# Patient Record
Sex: FEMALE | Race: WHITE | NOT HISPANIC OR LATINO | URBAN - METROPOLITAN AREA
[De-identification: names, ages, dates, MRNs, and addresses within clinical notes are randomized per-mention and may not be internally consistent; named-entity substitution may affect disease eponyms.]

---

## 2017-05-17 ENCOUNTER — OUTPATIENT (OUTPATIENT)
Dept: OUTPATIENT SERVICES | Facility: HOSPITAL | Age: 62
LOS: 1 days | End: 2017-05-17
Payer: COMMERCIAL

## 2017-05-17 PROCEDURE — G0206: CPT | Mod: 26,LT

## 2017-05-17 PROCEDURE — 77065 DX MAMMO INCL CAD UNI: CPT

## 2017-11-27 ENCOUNTER — OUTPATIENT (OUTPATIENT)
Dept: OUTPATIENT SERVICES | Facility: HOSPITAL | Age: 62
LOS: 1 days | End: 2017-11-27
Payer: COMMERCIAL

## 2017-11-27 PROCEDURE — 76641 ULTRASOUND BREAST COMPLETE: CPT

## 2017-11-27 PROCEDURE — 76641 ULTRASOUND BREAST COMPLETE: CPT | Mod: 26,50

## 2017-11-27 PROCEDURE — 77066 DX MAMMO INCL CAD BI: CPT

## 2017-11-27 PROCEDURE — G0204: CPT | Mod: 26

## 2017-12-12 ENCOUNTER — OUTPATIENT (OUTPATIENT)
Dept: OUTPATIENT SERVICES | Facility: HOSPITAL | Age: 62
LOS: 1 days | End: 2017-12-12
Payer: COMMERCIAL

## 2017-12-12 PROCEDURE — 88305 TISSUE EXAM BY PATHOLOGIST: CPT

## 2017-12-12 PROCEDURE — A4648: CPT

## 2017-12-12 PROCEDURE — 19081 BX BREAST 1ST LESION STRTCTC: CPT

## 2017-12-12 PROCEDURE — C1819: CPT

## 2017-12-12 PROCEDURE — 19081 BX BREAST 1ST LESION STRTCTC: CPT | Mod: LT

## 2017-12-13 LAB — SURGICAL PATHOLOGY STUDY: SIGNIFICANT CHANGE UP

## 2018-01-31 PROBLEM — Z00.00 ENCOUNTER FOR PREVENTIVE HEALTH EXAMINATION: Status: ACTIVE | Noted: 2018-01-31

## 2018-06-15 ENCOUNTER — APPOINTMENT (OUTPATIENT)
Dept: MAMMOGRAPHY | Facility: HOSPITAL | Age: 63
End: 2018-06-15

## 2018-06-15 ENCOUNTER — OUTPATIENT (OUTPATIENT)
Dept: OUTPATIENT SERVICES | Facility: HOSPITAL | Age: 63
LOS: 1 days | End: 2018-06-15
Payer: COMMERCIAL

## 2018-06-15 PROCEDURE — 77066 DX MAMMO INCL CAD BI: CPT | Mod: 26

## 2018-06-15 PROCEDURE — 77066 DX MAMMO INCL CAD BI: CPT

## 2018-06-20 ENCOUNTER — OUTPATIENT (OUTPATIENT)
Dept: OUTPATIENT SERVICES | Facility: HOSPITAL | Age: 63
LOS: 1 days | End: 2018-06-20
Payer: COMMERCIAL

## 2018-06-20 ENCOUNTER — APPOINTMENT (OUTPATIENT)
Dept: MAMMOGRAPHY | Facility: HOSPITAL | Age: 63
End: 2018-06-20
Payer: COMMERCIAL

## 2018-06-20 ENCOUNTER — RESULT REVIEW (OUTPATIENT)
Age: 63
End: 2018-06-20

## 2018-06-20 PROCEDURE — A4648: CPT

## 2018-06-20 PROCEDURE — 88305 TISSUE EXAM BY PATHOLOGIST: CPT

## 2018-06-20 PROCEDURE — 19081 BX BREAST 1ST LESION STRTCTC: CPT | Mod: RT

## 2018-06-20 PROCEDURE — 19081 BX BREAST 1ST LESION STRTCTC: CPT

## 2018-06-21 LAB — SURGICAL PATHOLOGY STUDY: SIGNIFICANT CHANGE UP

## 2018-12-14 ENCOUNTER — OUTPATIENT (OUTPATIENT)
Dept: OUTPATIENT SERVICES | Facility: HOSPITAL | Age: 63
LOS: 1 days | End: 2018-12-14
Payer: COMMERCIAL

## 2018-12-14 ENCOUNTER — APPOINTMENT (OUTPATIENT)
Dept: MAMMOGRAPHY | Facility: HOSPITAL | Age: 63
End: 2018-12-14
Payer: COMMERCIAL

## 2018-12-14 PROCEDURE — 77065 DX MAMMO INCL CAD UNI: CPT

## 2018-12-14 PROCEDURE — 77065 DX MAMMO INCL CAD UNI: CPT | Mod: 26,RT

## 2018-12-14 PROCEDURE — G0279: CPT

## 2018-12-14 PROCEDURE — G0279: CPT | Mod: 26

## 2019-06-27 ENCOUNTER — APPOINTMENT (OUTPATIENT)
Dept: ULTRASOUND IMAGING | Facility: HOSPITAL | Age: 64
End: 2019-06-27

## 2019-06-27 ENCOUNTER — OUTPATIENT (OUTPATIENT)
Dept: OUTPATIENT SERVICES | Facility: HOSPITAL | Age: 64
LOS: 1 days | End: 2019-06-27
Payer: COMMERCIAL

## 2019-06-27 ENCOUNTER — APPOINTMENT (OUTPATIENT)
Dept: MAMMOGRAPHY | Facility: HOSPITAL | Age: 64
End: 2019-06-27
Payer: COMMERCIAL

## 2019-06-27 PROCEDURE — 76641 ULTRASOUND BREAST COMPLETE: CPT | Mod: 26,50

## 2019-06-27 PROCEDURE — 77066 DX MAMMO INCL CAD BI: CPT | Mod: 26

## 2019-06-27 PROCEDURE — 77066 DX MAMMO INCL CAD BI: CPT

## 2019-06-27 PROCEDURE — 76641 ULTRASOUND BREAST COMPLETE: CPT

## 2019-07-17 ENCOUNTER — APPOINTMENT (OUTPATIENT)
Dept: MAMMOGRAPHY | Facility: HOSPITAL | Age: 64
End: 2019-07-17
Payer: COMMERCIAL

## 2019-07-17 ENCOUNTER — RESULT REVIEW (OUTPATIENT)
Age: 64
End: 2019-07-17

## 2019-07-17 ENCOUNTER — OUTPATIENT (OUTPATIENT)
Dept: OUTPATIENT SERVICES | Facility: HOSPITAL | Age: 64
LOS: 1 days | End: 2019-07-17
Payer: COMMERCIAL

## 2019-07-17 PROCEDURE — 19081 BX BREAST 1ST LESION STRTCTC: CPT

## 2019-07-17 PROCEDURE — A4648: CPT

## 2019-07-17 PROCEDURE — 19081 BX BREAST 1ST LESION STRTCTC: CPT | Mod: RT

## 2019-07-17 PROCEDURE — 88305 TISSUE EXAM BY PATHOLOGIST: CPT

## 2019-07-18 LAB — SURGICAL PATHOLOGY STUDY: SIGNIFICANT CHANGE UP

## 2020-01-13 ENCOUNTER — OUTPATIENT (OUTPATIENT)
Dept: OUTPATIENT SERVICES | Facility: HOSPITAL | Age: 65
LOS: 1 days | End: 2020-01-13
Payer: COMMERCIAL

## 2020-01-13 ENCOUNTER — APPOINTMENT (OUTPATIENT)
Dept: MAMMOGRAPHY | Facility: HOSPITAL | Age: 65
End: 2020-01-13
Payer: MEDICARE

## 2020-01-13 PROCEDURE — G0279: CPT | Mod: 26

## 2020-01-13 PROCEDURE — G0279: CPT

## 2020-01-13 PROCEDURE — 77065 DX MAMMO INCL CAD UNI: CPT

## 2020-01-13 PROCEDURE — 77065 DX MAMMO INCL CAD UNI: CPT | Mod: 26,RT

## 2020-07-06 ENCOUNTER — APPOINTMENT (OUTPATIENT)
Dept: MAMMOGRAPHY | Facility: HOSPITAL | Age: 65
End: 2020-07-06
Payer: MEDICARE

## 2020-07-06 ENCOUNTER — OUTPATIENT (OUTPATIENT)
Dept: OUTPATIENT SERVICES | Facility: HOSPITAL | Age: 65
LOS: 1 days | End: 2020-07-06
Payer: MEDICARE

## 2020-07-06 PROCEDURE — G0279: CPT | Mod: 26

## 2020-07-06 PROCEDURE — 77066 DX MAMMO INCL CAD BI: CPT

## 2020-07-06 PROCEDURE — G0279: CPT

## 2020-07-06 PROCEDURE — 77066 DX MAMMO INCL CAD BI: CPT | Mod: 26

## 2021-08-23 ENCOUNTER — OUTPATIENT (OUTPATIENT)
Dept: OUTPATIENT SERVICES | Facility: HOSPITAL | Age: 66
LOS: 1 days | End: 2021-08-23
Payer: MEDICARE

## 2021-08-23 ENCOUNTER — APPOINTMENT (OUTPATIENT)
Dept: MAMMOGRAPHY | Facility: HOSPITAL | Age: 66
End: 2021-08-23
Payer: MEDICARE

## 2021-08-23 PROCEDURE — 77066 DX MAMMO INCL CAD BI: CPT | Mod: 26

## 2021-08-23 PROCEDURE — 77066 DX MAMMO INCL CAD BI: CPT

## 2022-09-16 ENCOUNTER — APPOINTMENT (OUTPATIENT)
Dept: ORTHOPEDIC SURGERY | Facility: CLINIC | Age: 67
End: 2022-09-16

## 2022-09-16 DIAGNOSIS — E11.9 TYPE 2 DIABETES MELLITUS W/OUT COMPLICATIONS: ICD-10-CM

## 2022-09-16 DIAGNOSIS — M17.12 UNILATERAL PRIMARY OSTEOARTHRITIS, LEFT KNEE: ICD-10-CM

## 2022-09-16 PROCEDURE — 99213 OFFICE O/P EST LOW 20 MIN: CPT

## 2022-09-16 NOTE — DISCUSSION/SUMMARY
[de-identified] :  patient currently doing well.  Patient has no complaints at this time.  Discussed with patient to continue stretching at home, range of motion exercises for tightness in leg.  Advised patient to call office if pain worsens or changes.  Future repeat gel injection if needed.  Call if any questions or concerns.  Patient understands and agrees with plan.

## 2022-09-16 NOTE — IMAGING
[de-identified] :  On examination of the patient's left knee,  no edema, non ecchymotic, skin is intact.  mild tenderness along the medial lateral joint lines. She has a positive patellar grind. She can actively straight leg raise. Her motion is approximately 0-125. No instability in the knee. Calf soft and nontender. Neurovascular intact distally.

## 2022-09-16 NOTE — HISTORY OF PRESENT ILLNESS
[de-identified] : Patient is a 66-year-old female here for follow-up left knee osteoarthritis.  Patient states she was having pain due to a lot of walking couple weeks ago but has  overall improved.  Patient states she is feeling a lot better.  Patient states pain comes and goes since she has been treated.  Patient states pain comes with activity when walking long distances, standing long periods of time.  Denies instability, denies numbness and tingling, denies injury/trauma

## 2022-09-27 ENCOUNTER — APPOINTMENT (OUTPATIENT)
Dept: MAMMOGRAPHY | Facility: HOSPITAL | Age: 67
End: 2022-09-27

## 2022-09-27 ENCOUNTER — APPOINTMENT (OUTPATIENT)
Dept: ULTRASOUND IMAGING | Facility: HOSPITAL | Age: 67
End: 2022-09-27

## 2022-09-27 ENCOUNTER — OUTPATIENT (OUTPATIENT)
Dept: OUTPATIENT SERVICES | Facility: HOSPITAL | Age: 67
LOS: 1 days | End: 2022-09-27
Payer: MEDICARE

## 2022-09-27 PROCEDURE — 77066 DX MAMMO INCL CAD BI: CPT

## 2022-09-27 PROCEDURE — 77066 DX MAMMO INCL CAD BI: CPT | Mod: 26

## 2022-09-27 PROCEDURE — G0279: CPT | Mod: 26

## 2022-09-27 PROCEDURE — 76641 ULTRASOUND BREAST COMPLETE: CPT

## 2022-09-27 PROCEDURE — 76641 ULTRASOUND BREAST COMPLETE: CPT | Mod: 26,50

## 2022-09-27 PROCEDURE — 82565 ASSAY OF CREATININE: CPT

## 2022-09-27 PROCEDURE — G0279: CPT

## 2022-12-22 ENCOUNTER — APPOINTMENT (OUTPATIENT)
Dept: ORTHOPEDIC SURGERY | Facility: CLINIC | Age: 67
End: 2022-12-22

## 2022-12-22 PROCEDURE — 73562 X-RAY EXAM OF KNEE 3: CPT | Mod: 50

## 2022-12-22 PROCEDURE — 99203 OFFICE O/P NEW LOW 30 MIN: CPT

## 2022-12-26 NOTE — HISTORY OF PRESENT ILLNESS
[de-identified] : Patient here for evaluation bilateral knee pain.\par Denies instability\par Pain with ambulation and stairs\par \par NAD\par bilateral knee\par No skin breakdown\par Tricompartmental TTP\par Positive patella grind\par PF crepitus\par Negative lachman\par Negative varus/valgus instability\par ROM 0-110\par Pain with forced extension and flexion\par NVI\par Compartments soft and NT\par \par Xray reviewed and significant for bilateral knee arthritis\par \par Plan\par went over findings\par explained the xrays\par tx options discussed\par spoke about benefit of wt loss\par will proceed with visco for sergio knee for oa\par fu pending auth

## 2023-01-13 ENCOUNTER — APPOINTMENT (OUTPATIENT)
Dept: ORTHOPEDIC SURGERY | Facility: CLINIC | Age: 68
End: 2023-01-13
Payer: MEDICARE

## 2023-01-13 ENCOUNTER — APPOINTMENT (OUTPATIENT)
Dept: ORTHOPEDIC SURGERY | Facility: CLINIC | Age: 68
End: 2023-01-13

## 2023-01-13 PROCEDURE — 20610 DRAIN/INJ JOINT/BURSA W/O US: CPT | Mod: 50

## 2023-01-13 PROCEDURE — 99213 OFFICE O/P EST LOW 20 MIN: CPT | Mod: 25

## 2023-01-13 NOTE — HISTORY OF PRESENT ILLNESS
[de-identified] :  Patient is a 67-year-old female who reports the office for subsequent re-evaluation of her bilateral knee pain/osteoarthritis.  She is here to receive bilateral knee Synvisc-One gel injections.

## 2023-01-13 NOTE — DISCUSSION/SUMMARY
[de-identified] :  With the patient's approval, the bilateral knee Synvisc-One injection was performed in the office today.  See the attached procedure note for further details.  Explained to the patient that the full effect of the medication may take up to 6 weeks for it to kick in.\par \par The patient was advised to rest/ice the area and can alternate with warm compresses.  Instructed not to do any strenuous activity that would further aggravate their symptoms.\par \par Patient will follow-up in 5-6 months for further evaluation.  All of the patient's questions/concerns were answered in detail.  \par \par The patient was seen under the supervision of Dr. Lugo.

## 2023-01-13 NOTE — PROCEDURE
[Large Joint Injection] : Large joint injection [Bilateral] : bilaterally of the [Knee] : knee [Alcohol] : alcohol [Synvisc-one (48mg)] : 48mg of Synvisc-one [] : Patient tolerated procedure well [Call if redness, pain or fever occur] : call if redness, pain or fever occur [Apply ice for 15min out of every hour for the next 12-24 hours as tolerated] : apply ice for 15 minutes out of every hour for the next 12-24 hours as tolerated

## 2023-01-13 NOTE — IMAGING
[de-identified] :   Examination of bilateral knees as follows:  No effusion noted.  No erythema or ecchymosis.  Able to from extra leg raise.  Knee flexion from 0-120 degrees.  Calf soft nontender.  Light touch intact throughout.   Nonantalgic gait
-7.01

## 2023-07-13 ENCOUNTER — APPOINTMENT (OUTPATIENT)
Dept: ORTHOPEDIC SURGERY | Facility: CLINIC | Age: 68
End: 2023-07-13
Payer: MEDICARE

## 2023-07-13 PROCEDURE — 99213 OFFICE O/P EST LOW 20 MIN: CPT

## 2023-07-13 NOTE — DISCUSSION/SUMMARY
[de-identified] : Patient will continue using a pain relief cream that was prescribed to her by her PCP as needed for pain.  Does not recall the name of this medication.  The patient was advised to rest/ice the area and may alternate with warm compresses as needed.\par \par The knee conditioning program from the [facility] was given to the patient so they may try that at home.  Bilateral knee Synvisc 1 gel injections ordered for the patient so she may receive that her next visit.  \par \par The patient will follow-up in 6-8 weeks for further evaluation.  All of the patient's questions/concerns were answered in detail.\par \par

## 2023-07-13 NOTE — HISTORY OF PRESENT ILLNESS
[de-identified] : Patient is a 67-year-old female who reports to the office for reevaluation of her bilateral knee pain/osteoarthritis.  She had gel injections done in January 2023 which has given her some relief.  Walking, certain range of motion, and palpating certain areas of the knee aggravate the patient's pain at times.  Denies any numbness or tingling.

## 2023-07-13 NOTE — IMAGING
[de-identified] : Bilateral knee exam is as follows: No effusion noted.  No erythema or ecchymosis.  Able to perform active straight leg raise.  Knee flexion from 0 to 100 degrees with stiffness.  Medial joint line tenderness to palpation.  Calf is soft and nontender.  Light touch intact throughout.  Nonantalgic gait.

## 2023-09-14 ENCOUNTER — APPOINTMENT (OUTPATIENT)
Dept: ORTHOPEDIC SURGERY | Facility: CLINIC | Age: 68
End: 2023-09-14
Payer: MEDICARE

## 2023-09-14 PROCEDURE — 99213 OFFICE O/P EST LOW 20 MIN: CPT | Mod: 25

## 2023-09-14 PROCEDURE — 20610 DRAIN/INJ JOINT/BURSA W/O US: CPT | Mod: 50

## 2023-09-29 ENCOUNTER — APPOINTMENT (OUTPATIENT)
Dept: ULTRASOUND IMAGING | Facility: HOSPITAL | Age: 68
End: 2023-09-29
Payer: MEDICARE

## 2023-09-29 ENCOUNTER — APPOINTMENT (OUTPATIENT)
Dept: MAMMOGRAPHY | Facility: HOSPITAL | Age: 68
End: 2023-09-29
Payer: MEDICARE

## 2023-09-29 ENCOUNTER — OUTPATIENT (OUTPATIENT)
Dept: OUTPATIENT SERVICES | Facility: HOSPITAL | Age: 68
LOS: 1 days | End: 2023-09-29
Payer: MEDICARE

## 2023-09-29 PROCEDURE — 77066 DX MAMMO INCL CAD BI: CPT | Mod: 26

## 2023-09-29 PROCEDURE — 76641 ULTRASOUND BREAST COMPLETE: CPT

## 2023-09-29 PROCEDURE — 77066 DX MAMMO INCL CAD BI: CPT

## 2023-09-29 PROCEDURE — G0279: CPT | Mod: 26

## 2023-09-29 PROCEDURE — 76641 ULTRASOUND BREAST COMPLETE: CPT | Mod: 26,50,3G

## 2023-09-29 PROCEDURE — 82565 ASSAY OF CREATININE: CPT

## 2023-09-29 PROCEDURE — G0279: CPT

## 2024-05-23 ENCOUNTER — APPOINTMENT (OUTPATIENT)
Dept: ORTHOPEDIC SURGERY | Facility: CLINIC | Age: 69
End: 2024-05-23
Payer: MEDICARE

## 2024-05-23 DIAGNOSIS — M17.0 BILATERAL PRIMARY OSTEOARTHRITIS OF KNEE: ICD-10-CM

## 2024-05-23 DIAGNOSIS — M25.511 PAIN IN RIGHT SHOULDER: ICD-10-CM

## 2024-05-23 PROCEDURE — 73030 X-RAY EXAM OF SHOULDER: CPT | Mod: RT

## 2024-05-23 PROCEDURE — 99213 OFFICE O/P EST LOW 20 MIN: CPT

## 2024-05-23 NOTE — DISCUSSION/SUMMARY
[de-identified] : Bilateral knee pain follow-up and right shoulder pain  History of Present Illness Patient is a 68-year-old female who reports to the office for reevaluation of her bilateral knee pain/osteoarthritis. She had gel injections done in September 2023 which has given her relief.  Pain has been acting up as of late.  She also states that she has right shoulder pain has been aggravating her for the past few weeks.  Denies any recent trauma or injury to the area. certain range of motion and palpating certain areas of the knee aggravate the patient's pain at times. Denies any numbness or tingling.  Physical Exam Bilateral knee exam is as follows: No effusion noted. No erythema or ecchymosis. Able to perform active straight leg raise. Knee flexion from 0 to 100 degrees with stiffness. Medial joint line tenderness to palpation. Calf is soft and nontender. Light touch intact throughout. Nonantalgic gait.  Right shoulder exam is as follows: No swelling noted.  No erythema or ecchymosis.  Active forward flexion abduction to approximately 140 degrees, passive forward flexion and abduction to 170 degrees with pain.  Internal rotation to L4 and external Tatian to 90 degrees.  There is decent strength with discomfort.  Mild pain with Jobes and [place]cari's testing.  Light touch intact throughout.  Right shoulder x-rays taken in office today reveal no obvious fractures, subluxations, or dislocations.  Glenohumeral joint arthritic change noted. otherwise, no other significant abnormalities were seen.     Discussion/Summary OTC Tylenol or Motrin as needed for pain.  The patient was advised to rest/ice the area and may alternate with warm compresses as needed.  The knee and shoulder conditioning program from the [facility] was given to the patient so they may try that at home.   Patient benefited from viscosupplementation injections in the past.  Bilateral knee Synvisc 1 gel injections ordered for the patient so she may receive that her next visit.  Follow-up in 6 weeks.  All questions/concerns were answered in detail.

## 2024-07-12 ENCOUNTER — APPOINTMENT (OUTPATIENT)
Dept: ORTHOPEDIC SURGERY | Facility: CLINIC | Age: 69
End: 2024-07-12

## 2024-08-29 ENCOUNTER — APPOINTMENT (OUTPATIENT)
Dept: ORTHOPEDIC SURGERY | Facility: CLINIC | Age: 69
End: 2024-08-29
Payer: MEDICARE

## 2024-08-29 DIAGNOSIS — M17.0 BILATERAL PRIMARY OSTEOARTHRITIS OF KNEE: ICD-10-CM

## 2024-08-29 PROCEDURE — 99213 OFFICE O/P EST LOW 20 MIN: CPT | Mod: 25

## 2024-08-29 PROCEDURE — 20610 DRAIN/INJ JOINT/BURSA W/O US: CPT | Mod: 50

## 2024-08-29 NOTE — DISCUSSION/SUMMARY
[de-identified] : Bilateral knee pain follow-up  HPI Patient is a 68-year-old female reports to the office for subsequent reevaluation of her bilateral knee pain/osteoarthritis. She is here to receive bilateral knee Synvisc 1 gel injections.  Bilateral knee exam is as follows: Minimal effusion noted. No erythema or ecchymosis. Able to perform active for leg raise. Knee flexion from 0 to 120 degrees. Calf soft and nontender. Light touch intact throughout. Nonantalgic gait.  Assessment/plan With the patient's approval, the bilateral knee Synvisc-1 gel injections were performed in the office today. See the attached procedure note for further details. Explained to the patient that the full effect of the medication may take up to 6 weeks for it to kick in.  The patient was advised to rest/ice the area and can alternate with warm compresses. Instructed not to do any strenuous activity that would further aggravate their symptoms.  Patient will follow-up in 5-6 months for further evaluation. All of the patient's questions/concerns were answered in detail.  Procedure Large joint injection was performed bilaterally of the knee. The site was prepped with alcohol and sterile technique used. An injection of 48mg of Synvisc-one was used.  Patient tolerated procedure well. Patient was advised to call if redness, pain or fever occur and apply ice for 15 minutes out of every hour for the next 12-24 hours as tolerated.    The risks benefits, and alternatives have been discussed, and verbal consent was obtained.

## 2024-10-01 ENCOUNTER — APPOINTMENT (OUTPATIENT)
Dept: MAMMOGRAPHY | Facility: HOSPITAL | Age: 69
End: 2024-10-01

## 2024-10-01 ENCOUNTER — APPOINTMENT (OUTPATIENT)
Dept: ULTRASOUND IMAGING | Facility: HOSPITAL | Age: 69
End: 2024-10-01

## 2024-10-01 ENCOUNTER — OUTPATIENT (OUTPATIENT)
Dept: OUTPATIENT SERVICES | Facility: HOSPITAL | Age: 69
LOS: 1 days | End: 2024-10-01
Payer: MEDICARE

## 2024-10-01 PROCEDURE — 76641 ULTRASOUND BREAST COMPLETE: CPT | Mod: 26,50,3G

## 2024-10-01 PROCEDURE — 77066 DX MAMMO INCL CAD BI: CPT | Mod: 26

## 2024-10-01 PROCEDURE — G0279: CPT | Mod: 26

## 2024-11-20 PROCEDURE — 82565 ASSAY OF CREATININE: CPT

## 2024-11-20 PROCEDURE — G0279: CPT

## 2024-11-20 PROCEDURE — 76641 ULTRASOUND BREAST COMPLETE: CPT

## 2024-11-20 PROCEDURE — 77066 DX MAMMO INCL CAD BI: CPT

## 2025-06-12 ENCOUNTER — APPOINTMENT (OUTPATIENT)
Dept: ORTHOPEDIC SURGERY | Facility: CLINIC | Age: 70
End: 2025-06-12

## 2025-06-26 ENCOUNTER — APPOINTMENT (OUTPATIENT)
Dept: ORTHOPEDIC SURGERY | Facility: CLINIC | Age: 70
End: 2025-06-26
Payer: MEDICARE

## 2025-06-26 PROCEDURE — 99213 OFFICE O/P EST LOW 20 MIN: CPT

## 2025-08-08 ENCOUNTER — APPOINTMENT (OUTPATIENT)
Dept: ORTHOPEDIC SURGERY | Facility: CLINIC | Age: 70
End: 2025-08-08
Payer: MEDICARE

## 2025-08-08 DIAGNOSIS — M17.0 BILATERAL PRIMARY OSTEOARTHRITIS OF KNEE: ICD-10-CM

## 2025-08-08 PROCEDURE — 99213 OFFICE O/P EST LOW 20 MIN: CPT | Mod: 25

## 2025-08-08 PROCEDURE — 20610 DRAIN/INJ JOINT/BURSA W/O US: CPT | Mod: 50
